# Patient Record
Sex: FEMALE | Race: OTHER | Employment: FULL TIME | URBAN - METROPOLITAN AREA
[De-identification: names, ages, dates, MRNs, and addresses within clinical notes are randomized per-mention and may not be internally consistent; named-entity substitution may affect disease eponyms.]

---

## 2019-05-16 ENCOUNTER — HOSPITAL ENCOUNTER (EMERGENCY)
Age: 44
Discharge: HOME | End: 2019-05-16
Attending: EMERGENCY MEDICINE
Payer: SELF-PAY

## 2019-05-16 VITALS
DIASTOLIC BLOOD PRESSURE: 80 MMHG | OXYGEN SATURATION: 99 % | WEIGHT: 126 LBS | TEMPERATURE: 98.6 F | HEIGHT: 60 IN | BODY MASS INDEX: 24.74 KG/M2 | SYSTOLIC BLOOD PRESSURE: 124 MMHG | HEART RATE: 57 BPM | RESPIRATION RATE: 16 BRPM

## 2019-05-16 DIAGNOSIS — H10.212 CHEMICAL CONJUNCTIVITIS OF LEFT EYE: Primary | ICD-10-CM

## 2019-05-16 PROCEDURE — 99284 EMERGENCY DEPT VISIT MOD MDM: CPT | Performed by: EMERGENCY MEDICINE

## 2019-05-16 PROCEDURE — 74011250637 HC RX REV CODE- 250/637: Performed by: EMERGENCY MEDICINE

## 2019-05-16 RX ORDER — ERYTHROMYCIN 5 MG/G
OINTMENT OPHTHALMIC
Status: COMPLETED | OUTPATIENT
Start: 2019-05-16 | End: 2019-05-16

## 2019-05-16 RX ADMIN — ERYTHROMYCIN: 5 OINTMENT OPHTHALMIC at 21:28

## 2019-05-16 NOTE — ED TRIAGE NOTES
used during triage. Patient with redness to left eye noted today in mirror. Patient denies any vision changes however worse pain with sunlight. No known trauma, states cleaned bathroom yesterday but does not have a moment of feeling anything get in her eye.

## 2019-05-17 NOTE — DISCHARGE INSTRUCTIONS
Use the eye ointment inside the lower lid 3 times a day for 3 days  Return to the ER if worse in anyway  follow up with Dr. Jose Saldaña, From family practice, if no better  Or follow-up with Dr. Albaro Malloy from ophthalmology if no better      Patient Education        Conjuntivitis: [place] - [ Miranda Angelica: Care Instructions ]  Instrucciones de cuidado    La conjuntivitis es el enrojecimiento y la hinchazón de la superficie del marylu y [address] (el recubrimiento del párpado y [address]). La conjuntivitis suele ser causada por serg infección bacteriana o viral. El aire seco, las [place] humo y las sustancias químicas son otras causas comunes. La conjuntivitis suele sanar por sí antony al cabo de 7 a 10 días. Los antibióticos solo ayudan si la conjuntivitis está causada por bacterias. La conjuntivitis causada por serg infección se propaga fácilmente. Si serg alergia o sustancia química es la causa de la conjuntivitis, esta no desaparecerá a menos que usted evite lo que la esté causando. La atención de seguimiento es serg parte clave de bruno tratamiento y seguridad. Asegúrese de hacer y acudir a todas las citas, y llame a bruno médico si está teniendo problemas. También es serg buena idea saber los resultados de chito exámenes y mantener serg lista de los medicamentos que jose. ¿Cómo puede cuidarse en el hogar? · Lávese las cnorad con frecuencia. Siempre láveselas antes y después de tratarse la conjuntivitis o de tocarse los ojos o la kinza. · Utilice un algodón húmedo o un paño limpio y húmedo para retirar las costras. Limpie desde la esquina interior del marylu hacia afuera. Use serg parte limpia del paño para cada pasada. · Colóquese paños húmedos, fríos o tibios, sobre el marylu unas cuantas veces al día si el marylu le duele. · No use lentes de contacto ni maquillaje para los ojos hasta que la conjuntivitis haya desaparecido.  Deseche todo el maquillaje para ojos que usaba cuando comenzó la conjuntivitis. Limpie chito lentes de contacto y bruno estuche. Si [place] lentes de contacto desechables, use un par nuevo cuando el marylu haya sanado y sea seguro volver a usar lentes de contacto. · Si el médico le recetó serg pomada o gotas antibióticas para los ojos, úselas según las indicaciones. Use el medicamento todo el tiempo indicado, aunque el marylu comience a verse mejor en poco tiempo. Mantenga limpia la punta del frasco y no permita que la misma toque la rajesh del amrylu. · Para ponerse gotas para los ojos o pomada:  ? Incline la Beckie Brighter atrás y lleve el párpado inferior hacia abajo con un dedo. ? Deje caer unas gotas o un chorrito del medicamento dentro del párpado inferior. ? Cierre el marylu por entre 30 y 61 segundos para permitir que las gotas o la pomada se esparzan. ? No permita que la punta del gotero o del tubo de pomada toque las pestañas ni ninguna otra superficie. · No comparta toallas de baño, almohadas ni toallitas para la kinza mientras tenga conjuntivitis. ¿Cuándo debe pedir ayuda? Llame a bruno médico ahora mismo o busque atención médica inmediata si:    · Tiene dolor en el marylu, no solo irritación en la superficie.     · Tiene algún cambio en la vista o pérdida de la visión.     · Tiene mayor secreción del marylu.     · El marylu no ha comenzado a mejorar o empeora dentro de las 50 horas después de empezar a usar antibióticos.     · La conjuntivitis dura más de 7 días.    Preste especial atención a los cambios en bruno holly y asegúrese de comunicarse con bruno médico si tiene algún problema. ¿Dónde puede encontrar más información en inglés? Gurpreet Stephens a [email or web address]-wendy.info/. Melanie  [id number] en la búsqueda para aprender más acerca de \"Conjuntivitis: Instrucciones de cuidado - [ [place]: Care Instructions ]. \"  Revisado: 23 septiembre, 2018  Versión del contenido: 11.9  © 3167-2776 [employer].  Las instrucciones de cuidado fueron adaptadas bajo licencia por [employer] (which disclaims liability or warranty for this information). Si usted tiene [place] afección médica o sobre estas instrucciones, siempre pregunte a bruno profesional de holly. [facility] niega toda garantía o responsabilidad por bruno uso de esta información.

## 2019-05-17 NOTE — ED NOTES
I have reviewed discharge instructions with the patient. The patient verbalized understanding. Patient left ED via Discharge Method: ambulatory to Home with self. Opportunity for questions and clarification provided. Patient given 0 scripts. To continue your aftercare when you leave the hospital, you may receive an automated call from our care team to check in on how you are doing. This is a free service and part of our promise to provide the best care and service to meet your aftercare needs.  If you have questions, or wish to unsubscribe from this service please call [phone]. Thank you for Choosing our [facility] Emergency Department.

## 2019-05-19 NOTE — ED PROVIDER NOTES
Chief complaint : eye redness HISTORY OF PRESENT ILLNESS : 
Location : left eye, nasal sclera Quality : redness, mild pain Quantity : constant Timing : since waking this a.m. Severity : mild Alleviating / exacerbating factors : may have gotten some clorox bleach in it yesterday whilst cleaning Associated Symptoms : no vision changes No past medical history on file. No past surgical history on file. Family History:  
Problem Relation Age of Onset  Breast Cancer Neg Hx Social History Socioeconomic History  Marital status: SINGLE Spouse name: Not on file  Number of children: Not on file  Years of education: Not on file  Highest education level: Not on file Occupational History  Not on file Social Needs  Financial resource strain: Not on file  Food insecurity:  
  Worry: Not on file Inability: Not on file  Transportation needs:  
  Medical: Not on file Non-medical: Not on file Tobacco Use  Smoking status: Not on file Substance and Sexual Activity  Alcohol use: Not on file  Drug use: Not on file  Sexual activity: Not on file Lifestyle  Physical activity:  
  Days per week: Not on file Minutes per session: Not on file  Stress: Not on file Relationships  Social connections:  
  Talks on phone: Not on file Gets together: Not on file Attends Judaism service: Not on file Active member of club or organization: Not on file Attends meetings of clubs or organizations: Not on file Relationship status: Not on file  Intimate partner violence:  
  Fear of current or ex partner: Not on file Emotionally abused: Not on file Physically abused: Not on file Forced sexual activity: Not on file Other Topics Concern  Not on file Social History Narrative  Not on file ALLERGIES: Patient has no known allergies. Review of Systems Eyes: Positive for pain and redness. Negative for discharge, itching and visual disturbance. Vitals:  
 05/16/19 1817 05/16/19 2135 BP: 124/84 124/80 Pulse: (!) 59 (!) 57 Resp: 16 16 Temp: 98.8 °F (37.1 °C) 98.6 °F (37 °C) SpO2: 99% 99% Weight: 57.2 kg (126 lb) Height: 5' (1.524 m) Physical Exam  
Constitutional: She is oriented to person, place, and time. She appears well-developed and well-nourished. No distress. HENT:  
Head: Normocephalic and atraumatic. Eyes: Pupils are equal, round, and reactive to light. Conjunctivae and EOM are normal. Right eye exhibits no discharge. Left eye exhibits no discharge. Neck: Normal range of motion. Neck supple. Pulmonary/Chest: Effort normal. No respiratory distress. Musculoskeletal: Normal range of motion. Neurological: She is alert and oriented to person, place, and time. She has normal strength. She exhibits normal muscle tone. cni 2-12 grossly Nl gait, Nl speech Skin: Skin is warm and dry. No rash noted. She is not diaphoretic. Psychiatric: She has a normal mood and affect. Her behavior is normal.  
Nursing note and vitals reviewed. MDM Number of Diagnoses or Management Options Chemical conjunctivitis of left eye:  
Diagnosis management comments: Medical decision making note: Focal eye redness, recent bleach exposure Va is ok Possible subconj hemorrhage No contacts, 
Use erythro ointment a few days This concludes the \"medical decision making note\" part of this emergency department visit note. Procedures Expiration Date (Month Year): 2017-
